# Patient Record
Sex: FEMALE | Race: WHITE | URBAN - METROPOLITAN AREA
[De-identification: names, ages, dates, MRNs, and addresses within clinical notes are randomized per-mention and may not be internally consistent; named-entity substitution may affect disease eponyms.]

---

## 2022-09-09 ENCOUNTER — APPOINTMENT (RX ONLY)
Dept: URBAN - METROPOLITAN AREA CLINIC 216 | Facility: CLINIC | Age: 62
Setting detail: DERMATOLOGY
End: 2022-09-09

## 2022-09-09 DIAGNOSIS — Z41.9 ENCOUNTER FOR PROCEDURE FOR PURPOSES OTHER THAN REMEDYING HEALTH STATE, UNSPECIFIED: ICD-10-CM

## 2022-09-09 PROCEDURE — ? DYSPORT

## 2022-09-09 PROCEDURE — ? COSMETIC CONSULTATION: DYSPORT

## 2022-09-09 PROCEDURE — ? ADDITIONAL NOTES

## 2022-09-09 ASSESSMENT — LOCATION DETAILED DESCRIPTION DERM
LOCATION DETAILED: LEFT INFERIOR FOREHEAD
LOCATION DETAILED: RIGHT INFERIOR MEDIAL FOREHEAD
LOCATION DETAILED: LEFT MEDIAL FOREHEAD
LOCATION DETAILED: RIGHT INFERIOR FOREHEAD
LOCATION DETAILED: RIGHT MEDIAL FOREHEAD
LOCATION DETAILED: RIGHT SUPERIOR MEDIAL MALAR CHEEK
LOCATION DETAILED: GLABELLA
LOCATION DETAILED: LEFT FOREHEAD
LOCATION DETAILED: LEFT SUPERIOR MEDIAL MALAR CHEEK
LOCATION DETAILED: RIGHT FOREHEAD
LOCATION DETAILED: RIGHT MEDIAL ZYGOMA
LOCATION DETAILED: LEFT MEDIAL ZYGOMA

## 2022-09-09 ASSESSMENT — LOCATION SIMPLE DESCRIPTION DERM
LOCATION SIMPLE: RIGHT ZYGOMA
LOCATION SIMPLE: GLABELLA
LOCATION SIMPLE: LEFT CHEEK
LOCATION SIMPLE: RIGHT FOREHEAD
LOCATION SIMPLE: LEFT ZYGOMA
LOCATION SIMPLE: LEFT FOREHEAD
LOCATION SIMPLE: RIGHT CHEEK

## 2022-09-09 ASSESSMENT — LOCATION ZONE DERM: LOCATION ZONE: FACE

## 2022-09-09 NOTE — PROCEDURE: ADDITIONAL NOTES
Detail Level: Simple
Additional Notes: Patient to follow up in 3 months to see how treatment went\\nHas wedding in April, Anne advised to come in 6 weeks before
Render Risk Assessment In Note?: no

## 2023-03-02 ENCOUNTER — APPOINTMENT (RX ONLY)
Dept: URBAN - METROPOLITAN AREA CLINIC 216 | Facility: CLINIC | Age: 63
Setting detail: DERMATOLOGY
End: 2023-03-02

## 2023-03-02 DIAGNOSIS — Z41.9 ENCOUNTER FOR PROCEDURE FOR PURPOSES OTHER THAN REMEDYING HEALTH STATE, UNSPECIFIED: ICD-10-CM

## 2023-03-02 PROCEDURE — ? DYSPORT

## 2023-03-02 NOTE — PROCEDURE: DYSPORT
Consent: Written consent obtained. Risks include but not limited to lid/brow ptosis, bruising, swelling, diplopia, temporary effect, incomplete chemical denervation. (2) more than 100 beats/min

## 2023-03-02 NOTE — PROCEDURE: DYSPORT
Price (Use Numbers Only, No Special Characters Or $): 275 Price (Use Numbers Only, No Special Characters Or $): 593

## 2023-11-09 ENCOUNTER — APPOINTMENT (RX ONLY)
Dept: URBAN - METROPOLITAN AREA CLINIC 216 | Facility: CLINIC | Age: 63
Setting detail: DERMATOLOGY
End: 2023-11-09

## 2023-11-09 DIAGNOSIS — Z41.9 ENCOUNTER FOR PROCEDURE FOR PURPOSES OTHER THAN REMEDYING HEALTH STATE, UNSPECIFIED: ICD-10-CM

## 2023-11-09 PROCEDURE — ? DYSPORT

## 2023-11-09 NOTE — PROCEDURE: DYSPORT
Show Lateral Platysmal Band Units: Yes
Additional Area 2 Units: 0
Lot #: [id number]
Additional Area 3 Location: nasalis
Dilution (U/0.1 Cc): 2.5
Periorbital Skin Units: 72
Show Right And Left Pupillary Line Units: No
Expiration Date (Month Year): 4/30/25
Additional Area 4 Location: masseters
Anterior Platysmal Bands Units: 60
Price (Use Numbers Only, No Special Characters Or $): 458
Forehead Units: 36
Additional Area 1 Location: [facility]
Consent: Written consent obtained. Risks include but not limited to lid/brow ptosis, bruising, swelling, diplopia, temporary effect, incomplete chemical denervation.
Post-Care Instructions: Patient instructed to not lie down for 4 hours and limit physical activity for 24 hours.
Additional Area 2 Location: obicularis oris
Detail Level: Detailed

## 2024-05-30 ENCOUNTER — APPOINTMENT (RX ONLY)
Dept: URBAN - METROPOLITAN AREA CLINIC 216 | Facility: CLINIC | Age: 64
Setting detail: DERMATOLOGY
End: 2024-05-30

## 2024-05-30 DIAGNOSIS — Z41.9 ENCOUNTER FOR PROCEDURE FOR PURPOSES OTHER THAN REMEDYING HEALTH STATE, UNSPECIFIED: ICD-10-CM

## 2024-05-30 PROCEDURE — ? DYSPORT

## 2024-05-30 NOTE — PROCEDURE: DYSPORT
Show Lateral Platysmal Band Units: Yes
Additional Area 2 Units: 0
Lot #: [id number]
Additional Area 3 Location: nasalis
Dilution (U/0.1 Cc): 2.5
Periorbital Skin Units: 72
Show Right And Left Pupillary Line Units: No
Expiration Date (Month Year): 11/25
Additional Area 4 Location: masseters
Anterior Platysmal Bands Units: 60
Price (Use Numbers Only, No Special Characters Or $): 067
Forehead Units: 36
Additional Area 1 Location: [facility]
Consent: Written consent obtained. Risks include but not limited to lid/brow ptosis, bruising, swelling, diplopia, temporary effect, incomplete chemical denervation.
Post-Care Instructions: Patient instructed to not lie down for 4 hours and limit physical activity for 24 hours.
Additional Area 2 Location: obicularis oris
Depressor Anguli Oris Units: 12
Detail Level: Detailed

## 2024-12-10 ENCOUNTER — APPOINTMENT (OUTPATIENT)
Dept: URBAN - METROPOLITAN AREA CLINIC 216 | Facility: CLINIC | Age: 64
Setting detail: DERMATOLOGY
End: 2024-12-10

## 2024-12-10 DIAGNOSIS — Z41.9 ENCOUNTER FOR PROCEDURE FOR PURPOSES OTHER THAN REMEDYING HEALTH STATE, UNSPECIFIED: ICD-10-CM

## 2024-12-10 PROCEDURE — ? DYSPORT

## 2024-12-10 NOTE — PROCEDURE: DYSPORT
Show Lateral Platysmal Band Units: Yes
Additional Area 2 Units: 0
Lot #: [id number]
Additional Area 3 Location: nasalis
Dilution (U/0.1 Cc): 2.5
Periorbital Skin Units: 72
Show Right And Left Pupillary Line Units: No
Expiration Date (Month Year): 6/30/24
Additional Area 4 Location: masseters
Price (Use Numbers Only, No Special Characters Or $): 541
Additional Comments: used gift card from 7/2024 [employer], buy 2 areas get 60 units free. 60 units from promo vial
Forehead Units: 36
Glabellar Complex Units: 60
Additional Area 1 Location: [facility]
Consent: Written consent obtained. Risks include but not limited to lid/brow ptosis, bruising, swelling, diplopia, temporary effect, incomplete chemical denervation.
Post-Care Instructions: Patient instructed to not lie down for 4 hours and limit physical activity for 24 hours.
Additional Area 2 Location: obicularis oris
Depressor Anguli Oris Units: 12
Detail Level: Detailed

## 2025-07-03 ENCOUNTER — APPOINTMENT (OUTPATIENT)
Dept: URBAN - METROPOLITAN AREA CLINIC 216 | Facility: CLINIC | Age: 65
Setting detail: DERMATOLOGY
End: 2025-07-03

## 2025-07-03 DIAGNOSIS — Z41.9 ENCOUNTER FOR PROCEDURE FOR PURPOSES OTHER THAN REMEDYING HEALTH STATE, UNSPECIFIED: ICD-10-CM

## 2025-07-03 PROCEDURE — ? DYSPORT

## 2025-07-03 NOTE — PROCEDURE: DYSPORT
Show Lateral Platysmal Band Units: Yes
Additional Area 2 Units: 0
Lot #: [id number]
Additional Area 3 Location: nasalis
Dilution (U/0.1 Cc): 2.5
Periorbital Skin Units: 72
Show Right And Left Pupillary Line Units: No
Expiration Date (Month Year): 1/31/27
Additional Area 4 Location: masseters
Price (Use Numbers Only, No Special Characters Or $): 1080
Additional Area 5 Location: platysma
Forehead Units: 36
Additional Area 5 Units: 90
Glabellar Complex Units: 60
Additional Area 1 Location: [facility]
Consent: Written consent obtained. Risks include but not limited to lid/brow ptosis, bruising, swelling, diplopia, temporary effect, incomplete chemical denervation.
Post-Care Instructions: Patient instructed to not lie down for 4 hours and limit physical activity for 24 hours.
Additional Area 2 Location: obicularis oris
Depressor Anguli Oris Units: 12
Detail Level: Detailed